# Patient Record
Sex: MALE | Race: WHITE | HISPANIC OR LATINO | URBAN - METROPOLITAN AREA
[De-identification: names, ages, dates, MRNs, and addresses within clinical notes are randomized per-mention and may not be internally consistent; named-entity substitution may affect disease eponyms.]

---

## 2021-09-22 ENCOUNTER — OFFICE VISIT (OUTPATIENT)
Dept: URBAN - METROPOLITAN AREA CLINIC 63 | Facility: CLINIC | Age: 51
End: 2021-09-22

## 2022-07-09 ENCOUNTER — TELEPHONE ENCOUNTER (OUTPATIENT)
Dept: URBAN - METROPOLITAN AREA CLINIC 121 | Facility: CLINIC | Age: 52
End: 2022-07-09

## 2022-07-10 ENCOUNTER — TELEPHONE ENCOUNTER (OUTPATIENT)
Dept: URBAN - METROPOLITAN AREA CLINIC 121 | Facility: CLINIC | Age: 52
End: 2022-07-10

## 2022-07-20 ENCOUNTER — P2P PATIENT RECORD (OUTPATIENT)
Age: 52
End: 2022-07-20

## 2023-08-17 ENCOUNTER — TELEPHONE ENCOUNTER (OUTPATIENT)
Dept: URBAN - METROPOLITAN AREA CLINIC 63 | Facility: CLINIC | Age: 53
End: 2023-08-17

## 2023-08-17 ENCOUNTER — P2P PATIENT RECORD (OUTPATIENT)
Age: 53
End: 2023-08-17

## 2023-09-19 ENCOUNTER — WEB ENCOUNTER (OUTPATIENT)
Dept: URBAN - METROPOLITAN AREA CLINIC 60 | Facility: CLINIC | Age: 53
End: 2023-09-19

## 2023-09-19 ENCOUNTER — OFFICE VISIT (OUTPATIENT)
Dept: URBAN - METROPOLITAN AREA CLINIC 60 | Facility: CLINIC | Age: 53
End: 2023-09-19
Payer: COMMERCIAL

## 2023-09-19 VITALS
WEIGHT: 202 LBS | BODY MASS INDEX: 31.71 KG/M2 | OXYGEN SATURATION: 99 % | SYSTOLIC BLOOD PRESSURE: 120 MMHG | DIASTOLIC BLOOD PRESSURE: 78 MMHG | HEART RATE: 69 BPM | HEIGHT: 67 IN | TEMPERATURE: 97.4 F | RESPIRATION RATE: 20 BRPM

## 2023-09-19 DIAGNOSIS — K76.0 FATTY LIVER: ICD-10-CM

## 2023-09-19 DIAGNOSIS — Z12.11 COLON CANCER SCREENING: ICD-10-CM

## 2023-09-19 DIAGNOSIS — K29.60 REFLUX GASTRITIS: ICD-10-CM

## 2023-09-19 PROCEDURE — 99204 OFFICE O/P NEW MOD 45 MIN: CPT | Performed by: NURSE PRACTITIONER

## 2023-09-19 RX ORDER — PANTOPRAZOLE SODIUM 40 MG/1
1 TABLET TABLET, DELAYED RELEASE ORAL ONCE A DAY
Qty: 30 | Refills: 2 | OUTPATIENT
Start: 2023-09-19

## 2023-09-19 RX ORDER — IBUPROFEN 200 MG/1
1 TABLET WITH FOOD OR MILK AS NEEDED TABLET, FILM COATED ORAL THREE TIMES A DAY
Status: ACTIVE | COMMUNITY

## 2023-09-19 RX ORDER — SUCRALFATE 1 G/1
1 TABLET ON AN EMPTY STOMACH TABLET ORAL
Refills: 2 | OUTPATIENT
Start: 2023-09-19 | End: 2023-12-18

## 2023-09-19 NOTE — HPI-TODAY'S VISIT:
9/23 Patient here today in good general state, he is here complaining having symptom of gastritis and reflux.  Also, for his screening colonoscopy.  Patient never had colonoscopy done, denies any personal or family history of colorectal cancer.  Positive for hematochezia at times related with constipation.  Patient also has a CT scan that shows evidence of fatty liver. Patient was started on diet, PPI and Carafate.  EGD, liver work-up and colonoscopy.

## 2023-09-26 ENCOUNTER — LAB OUTSIDE AN ENCOUNTER (OUTPATIENT)
Dept: URBAN - METROPOLITAN AREA CLINIC 60 | Facility: CLINIC | Age: 53
End: 2023-09-26

## 2023-10-12 LAB
A/G RATIO: 1.8
ABSOLUTE BASOPHILS: 39
ABSOLUTE EOSINOPHILS: 250
ABSOLUTE LYMPHOCYTES: 1945
ABSOLUTE MONOCYTES: 608
ABSOLUTE NEUTROPHILS: 2058
ALBUMIN: 4.3
ALKALINE PHOSPHATASE: 53
ALT (SGPT): 27
AMYLASE: 56
AST (SGOT): 20
BASOPHILS: 0.8
BILIRUBIN, TOTAL: 0.6
BUN/CREATININE RATIO: (no result)
BUN: 16
CALCIUM: 9.2
CARBON DIOXIDE, TOTAL: 29
CHLORIDE: 104
CREATININE: 0.86
EGFR: 104
EOSINOPHILS: 5.1
GLOBULIN, TOTAL: 2.4
GLUCOSE: 98
HEMATOCRIT: 47.9
HEMOGLOBIN: 16.4
LIPASE: 28
LYMPHOCYTES: 39.7
MCH: 31.5
MCHC: 34.2
MCV: 92.1
MONOCYTES: 12.4
MPV: 11.9
NEUTROPHILS: 42
PLATELET COUNT: 175
POTASSIUM: 4.4
PROTEIN, TOTAL: 6.7
RDW: 12.8
RED BLOOD CELL COUNT: 5.2
SODIUM: 141
WHITE BLOOD CELL COUNT: 4.9

## 2023-11-10 ENCOUNTER — OUT OF OFFICE VISIT (OUTPATIENT)
Dept: URBAN - METROPOLITAN AREA SURGERY CENTER 4 | Facility: SURGERY CENTER | Age: 53
End: 2023-11-10
Payer: COMMERCIAL

## 2023-11-10 ENCOUNTER — CLAIMS CREATED FROM THE CLAIM WINDOW (OUTPATIENT)
Dept: URBAN - METROPOLITAN AREA CLINIC 4 | Facility: CLINIC | Age: 53
End: 2023-11-10
Payer: COMMERCIAL

## 2023-11-10 DIAGNOSIS — Z12.11 ENCOUNTER FOR SCREENING FOR MALIGNANT NEOPLASM OF COLON: ICD-10-CM

## 2023-11-10 DIAGNOSIS — K20.90 ESOPHAGITIS, UNSPECIFIED WITHOUT BLEEDING: ICD-10-CM

## 2023-11-10 DIAGNOSIS — K44.9 DIAPHRAGMATIC HERNIA WITHOUT OBSTRUCTION OR GANGRENE: ICD-10-CM

## 2023-11-10 DIAGNOSIS — K64.1 SECOND DEGREE HEMORRHOIDS: ICD-10-CM

## 2023-11-10 DIAGNOSIS — K31.89 OTHER DISEASES OF STOMACH AND DUODENUM: ICD-10-CM

## 2023-11-10 DIAGNOSIS — K63.5 POLYP OF TRANSVERSE COLON, UNSPECIFIED TYPE: ICD-10-CM

## 2023-11-10 DIAGNOSIS — T47.8X5A ADVERSE EFFECT OF OTHER AGENTS PRIMARILY AFFECTING GASTROINTESTINAL SYSTEM, INITIAL ENCOUNTER: ICD-10-CM

## 2023-11-10 DIAGNOSIS — Z12.11 COLON CANCER SCREENING (HIGH RISK): ICD-10-CM

## 2023-11-10 DIAGNOSIS — K29.50 CHRONIC GASTRITIS WITHOUT BLEEDING, UNSPECIFIED GASTRITIS TYPE: ICD-10-CM

## 2023-11-10 DIAGNOSIS — K63.5 BENIGN COLONIC POLYP: ICD-10-CM

## 2023-11-10 DIAGNOSIS — K44.9 HIATAL HERNIA: ICD-10-CM

## 2023-11-10 DIAGNOSIS — K21.9 ESOPHAGEAL REFLUX: ICD-10-CM

## 2023-11-10 PROCEDURE — 88305 TISSUE EXAM BY PATHOLOGIST: CPT | Performed by: PATHOLOGY

## 2023-11-10 PROCEDURE — 00813 ANES UPR LWR GI NDSC PX: CPT | Performed by: NURSE ANESTHETIST, CERTIFIED REGISTERED

## 2023-11-10 PROCEDURE — 43239 EGD BIOPSY SINGLE/MULTIPLE: CPT | Performed by: INTERNAL MEDICINE

## 2023-11-10 PROCEDURE — 45380 COLONOSCOPY AND BIOPSY: CPT | Performed by: INTERNAL MEDICINE

## 2023-11-10 PROCEDURE — 88312 SPECIAL STAINS GROUP 1: CPT | Performed by: PATHOLOGY

## 2023-11-10 RX ORDER — SUCRALFATE 1 G/1
1 TABLET ON AN EMPTY STOMACH TABLET ORAL
Refills: 2 | Status: ACTIVE | COMMUNITY
Start: 2023-09-19 | End: 2023-12-18

## 2023-11-10 RX ORDER — PANTOPRAZOLE SODIUM 40 MG/1
1 TABLET TABLET, DELAYED RELEASE ORAL ONCE A DAY
Qty: 30 | Refills: 2 | Status: ACTIVE | COMMUNITY
Start: 2023-09-19

## 2023-11-10 RX ORDER — IBUPROFEN 200 MG/1
1 TABLET WITH FOOD OR MILK AS NEEDED TABLET, FILM COATED ORAL THREE TIMES A DAY
Status: ACTIVE | COMMUNITY

## 2023-11-22 ENCOUNTER — OFFICE VISIT (OUTPATIENT)
Dept: URBAN - METROPOLITAN AREA CLINIC 60 | Facility: CLINIC | Age: 53
End: 2023-11-22

## 2023-11-22 ENCOUNTER — LAB OUTSIDE AN ENCOUNTER (OUTPATIENT)
Dept: URBAN - METROPOLITAN AREA CLINIC 63 | Facility: CLINIC | Age: 53
End: 2023-11-22

## 2023-11-22 RX ORDER — IBUPROFEN 200 MG/1
1 TABLET WITH FOOD OR MILK AS NEEDED TABLET, FILM COATED ORAL THREE TIMES A DAY
Status: ACTIVE | COMMUNITY

## 2023-11-22 RX ORDER — PANTOPRAZOLE SODIUM 40 MG/1
1 TABLET TABLET, DELAYED RELEASE ORAL ONCE A DAY
Qty: 30 | Refills: 2 | Status: ACTIVE | COMMUNITY
Start: 2023-09-19

## 2023-11-22 RX ORDER — SUCRALFATE 1 G/1
1 TABLET ON AN EMPTY STOMACH TABLET ORAL
Refills: 2 | Status: ACTIVE | COMMUNITY
Start: 2023-09-19 | End: 2023-12-18

## 2023-12-04 LAB
A/G RATIO: 2
ABSOLUTE BASOPHILS: 28
ABSOLUTE EOSINOPHILS: 230
ABSOLUTE LYMPHOCYTES: 1551
ABSOLUTE MONOCYTES: 465
ABSOLUTE NEUTROPHILS: 2425
ACTIN (SMOOTH MUSCLE) ANTIBODY (IGG): <20
AFP, SERUM, TUMOR MARKER: 2
ALBUMIN: 4.7
ALKALINE PHOSPHATASE: 53
ALPHA-1-ANTITRYPSIN (AAT) PHENOTYPE: (no result)
ALT (SGPT): 31
AMYLASE: 38
ANA SCREEN, IFA: NEGATIVE
AST (SGOT): 24
BASOPHILS: 0.6
BILIRUBIN, TOTAL: 0.5
BUN/CREATININE RATIO: (no result)
BUN: 14
CALCIUM: 9.3
CARBON DIOXIDE, TOTAL: 27
CHLORIDE: 104
CHOL/HDLC RATIO: 3.8
CHOLESTEROL, TOTAL: 188
CREATININE: 0.89
CYTOMEGALOVIRUS (CMV) AB, IGM: <30
CYTOMEGALOVIRUS ANTIBODY (IGG): 6.9
EGFR: 102
EOSINOPHILS: 4.9
GLOBULIN, TOTAL: 2.3
GLUCOSE: 105
HDL CHOLESTEROL: 50
HEMATOCRIT: 46.8
HEMOGLOBIN A1C: 5.8
HEMOGLOBIN: 16.2
HEP B CORE AB, IGM: (no result)
HEPATITIS A AB, TOTAL: REACTIVE
HEPATITIS B SURFACE ANTIGEN: (no result)
HEPATITIS C ANTIBODY: (no result)
HEPATITIS C VIRAL RNA: NOT DETECTED
INR: 1
LDL CHOLESTEROL CALC: 113
LIPASE: 14
LKM-1 ANTIBODY (IGG): <=20
LYMPHOCYTES: 33
MCH: 31.7
MCHC: 34.6
MCV: 91.6
MITOCHONDRIAL (M2) ANTIBODY: <=20
MONOCYTES: 9.9
MPV: 12
NEUTROPHILS: 51.6
NON HDL CHOLESTEROL: 138
PLATELET COUNT: 176
POTASSIUM: 4.6
PROTEIN, TOTAL: 7
PT: 10.6
RDW: 12.4
RED BLOOD CELL COUNT: 5.11
RHEUMATOID FACTOR: <14
SJOGREN'S ANTIBODY (SS-A): (no result)
SJOGREN'S ANTIBODY (SS-B): (no result)
SODIUM: 140
TRIGLYCERIDES: 135
WHITE BLOOD CELL COUNT: 4.7

## 2023-12-05 ENCOUNTER — OFFICE VISIT (OUTPATIENT)
Dept: URBAN - METROPOLITAN AREA CLINIC 60 | Facility: CLINIC | Age: 53
End: 2023-12-05
Payer: COMMERCIAL

## 2023-12-05 VITALS
BODY MASS INDEX: 32.02 KG/M2 | TEMPERATURE: 97.7 F | RESPIRATION RATE: 20 BRPM | OXYGEN SATURATION: 98 % | HEIGHT: 67 IN | DIASTOLIC BLOOD PRESSURE: 70 MMHG | HEART RATE: 66 BPM | WEIGHT: 204 LBS | SYSTOLIC BLOOD PRESSURE: 120 MMHG

## 2023-12-05 DIAGNOSIS — K76.0 FATTY LIVER: ICD-10-CM

## 2023-12-05 DIAGNOSIS — Z12.11 COLON CANCER SCREENING: ICD-10-CM

## 2023-12-05 DIAGNOSIS — K29.60 REFLUX GASTRITIS: ICD-10-CM

## 2023-12-05 PROCEDURE — 99214 OFFICE O/P EST MOD 30 MIN: CPT | Performed by: NURSE PRACTITIONER

## 2023-12-05 RX ORDER — SUCRALFATE 1 G/1
1 TABLET ON AN EMPTY STOMACH TABLET ORAL
Refills: 2 | OUTPATIENT

## 2023-12-05 RX ORDER — PANTOPRAZOLE SODIUM 40 MG/1
1 TABLET TABLET, DELAYED RELEASE ORAL ONCE A DAY
Qty: 30 | Refills: 2 | Status: ACTIVE | COMMUNITY
Start: 2023-09-19

## 2023-12-05 RX ORDER — PANTOPRAZOLE SODIUM 40 MG/1
1 TABLET TABLET, DELAYED RELEASE ORAL ONCE A DAY
Qty: 30 | Refills: 2 | OUTPATIENT

## 2023-12-05 RX ORDER — SUCRALFATE 1 G/1
1 TABLET ON AN EMPTY STOMACH TABLET ORAL
Refills: 2 | Status: ACTIVE | COMMUNITY
Start: 2023-09-19 | End: 2023-12-18

## 2023-12-05 NOTE — HPI-TODAY'S VISIT:
9/23 Patient here today in good general state, he is here complaining having symptom of gastritis and reflux.  Also, for his screening colonoscopy.  Patient never had colonoscopy done, denies any personal or family history of colorectal cancer.  Positive for hematochezia at times related with constipation.  Patient also has a CT scan that shows evidence of fatty liver. Patient was started on diet, PPI and Carafate.  EGD, liver work-up and colonoscopy. 12/23 Paient colonoscopy shows evidence of a 5 mm hyperplastic polyp in the transverse colon, and internal hemorrhoids.  EGD shows evidence of a grade a reflux esophagitis, small hiatal hernia, chronic gastritis, normal upper esophagus and examined duodenum.  Biopsy results: Duodenal mucosa with no significant abnormality.  Stomach antrum body biopsy proton pump inhibitor effect, negative for H. pylori organism, intestinal metaplasia, dysplasia, malignancy.  Lower third esophageal mucosa with reflux type changes, negative for Jauregui esophagus, dysplasia, or malignancy. Laboratories: Lipid panel with elevated LDL cholesterol 113, normal triglyceride cholesterol total and HDL cholesterol.  CMP elevated blood sugar 105, normal bilirubin, alkaline phosphatase, AST, and ALT.  Hemoglobin A1c slightly elevated 5.8, amylase and lipase are normal.  Autoimmune hepatitis is negative, coagulation INR and PTT are normal.  CBC is normal.  Alpha-fetoprotein is normal, Virus hepatitis panel is normal. Patient will keep good control of his blood sugar, diet, weight, and lipids.  Next colonoscopy will be in 10 years. FibroScan shows evidence of moderate hepatic steatosis without fibrosis.

## 2023-12-18 ENCOUNTER — TELEPHONE ENCOUNTER (OUTPATIENT)
Dept: URBAN - METROPOLITAN AREA CLINIC 57 | Facility: CLINIC | Age: 53
End: 2023-12-18

## 2023-12-18 RX ORDER — PANTOPRAZOLE SODIUM 40 MG/1
1 TABLET TABLET, DELAYED RELEASE ORAL ONCE A DAY
Qty: 30 | Refills: 2

## 2024-01-04 ENCOUNTER — TELEPHONE ENCOUNTER (OUTPATIENT)
Dept: URBAN - METROPOLITAN AREA CLINIC 64 | Facility: CLINIC | Age: 54
End: 2024-01-04

## 2024-03-05 ENCOUNTER — OV EP (OUTPATIENT)
Dept: URBAN - METROPOLITAN AREA CLINIC 60 | Facility: CLINIC | Age: 54
End: 2024-03-05

## 2024-03-19 ENCOUNTER — OV EP (OUTPATIENT)
Dept: URBAN - METROPOLITAN AREA CLINIC 60 | Facility: CLINIC | Age: 54
End: 2024-03-19

## 2024-03-19 RX ORDER — PANTOPRAZOLE 40 MG/1
TAKE 1 TABLET BY MOUTH ONCE DAILY TABLET, DELAYED RELEASE ORAL
Qty: 30 TABLET | Refills: 0 | Status: ACTIVE | COMMUNITY

## 2024-03-19 RX ORDER — SUCRALFATE 1 G/1
1 TABLET ON AN EMPTY STOMACH TABLET ORAL
Refills: 2 | Status: ACTIVE | COMMUNITY

## 2024-05-16 ENCOUNTER — ERX REFILL RESPONSE (OUTPATIENT)
Dept: URBAN - METROPOLITAN AREA CLINIC 57 | Facility: CLINIC | Age: 54
End: 2024-05-16

## 2024-05-16 RX ORDER — PANTOPRAZOLE 40 MG/1
TAKE 1 TABLET BY MOUTH ONCE DAILY TABLET, DELAYED RELEASE ORAL
Qty: 30 TABLET | Refills: 0 | OUTPATIENT

## 2024-11-26 ENCOUNTER — APPOINTMENT (RX ONLY)
Dept: URBAN - METROPOLITAN AREA CLINIC 121 | Facility: CLINIC | Age: 54
Setting detail: DERMATOLOGY
End: 2024-11-26

## 2024-11-26 DIAGNOSIS — L82.1 OTHER SEBORRHEIC KERATOSIS: ICD-10-CM

## 2024-11-26 DIAGNOSIS — D49.2 NEOPLASM OF UNSPECIFIED BEHAVIOR OF BONE, SOFT TISSUE, AND SKIN: ICD-10-CM

## 2024-11-26 PROCEDURE — ? COUNSELING

## 2024-11-26 PROCEDURE — 99202 OFFICE O/P NEW SF 15 MIN: CPT | Mod: 25

## 2024-11-26 PROCEDURE — ? SHAVE REMOVAL

## 2024-11-26 PROCEDURE — 11312 SHAVE SKIN LESION 1.1-2.0 CM: CPT

## 2024-11-26 ASSESSMENT — LOCATION ZONE DERM: LOCATION ZONE: FACE

## 2024-11-26 ASSESSMENT — LOCATION SIMPLE DESCRIPTION DERM
LOCATION SIMPLE: LEFT FOREHEAD
LOCATION SIMPLE: RIGHT TEMPLE

## 2024-11-26 ASSESSMENT — LOCATION DETAILED DESCRIPTION DERM
LOCATION DETAILED: LEFT SUPERIOR FOREHEAD
LOCATION DETAILED: RIGHT INFERIOR TEMPLE

## 2024-11-26 NOTE — PROCEDURE: SHAVE REMOVAL
Medical Necessity Information: It is in your best interest to select a reason for this procedure from the list below. All of these items fulfill various CMS LCD requirements except the new and changing color options.
Medical Necessity Clause: This procedure was medically necessary because the lesion that was treated was:
Lab: [id number]
Lab Facility: [id number]
Detail Level: Detailed
Was A Bandage Applied: Yes
Size Of Lesion In Cm (Required): 1.1
X Size Of Lesion In Cm (Optional): 0
Depth Of Shave: dermis
Biopsy Method: Dermablade
Anesthesia Type: 1% lidocaine with epinephrine
Anesthesia Volume In Cc: 0.3
Hemostasis: Aluminum Chloride and Electrocautery
Wound Care: Vaseline
Path Notes (To The Dermatopathologist): Please check margins
Render Path Notes In Note?: No
Consent was obtained from the patient. The risks and benefits to therapy were discussed in detail. Specifically, the risks of infection, scarring, bleeding, prolonged wound healing, incomplete removal, allergy to anesthesia, nerve injury and recurrence were addressed. Prior to the procedure, the treatment site was clearly identified and confirmed by the patient. All components of Universal Protocol/PAUSE Rule completed.
Post-Care Instructions: I reviewed with the patient in detail post-care instructions. Patient is to keep the biopsy site dry overnight, and then apply bacitracin twice daily until healed. Patient may apply hydrogen peroxide soaks to remove any crusting.
Notification Instructions: Patient will be notified of pathology results. However, patient instructed to call the office if not contacted within 2 weeks.
Billing Type: Third-Party Bill